# Patient Record
Sex: MALE | Race: WHITE
[De-identification: names, ages, dates, MRNs, and addresses within clinical notes are randomized per-mention and may not be internally consistent; named-entity substitution may affect disease eponyms.]

---

## 2017-12-06 ENCOUNTER — HOSPITAL ENCOUNTER (EMERGENCY)
Dept: HOSPITAL 76 - ED | Age: 52
Discharge: HOME | End: 2017-12-06
Payer: MEDICAID

## 2017-12-06 VITALS — DIASTOLIC BLOOD PRESSURE: 66 MMHG | SYSTOLIC BLOOD PRESSURE: 106 MMHG

## 2017-12-06 DIAGNOSIS — S68.129A: Primary | ICD-10-CM

## 2017-12-06 DIAGNOSIS — Z23: ICD-10-CM
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
## 2017-12-06 PROCEDURE — 99283 EMERGENCY DEPT VISIT LOW MDM: CPT

## 2017-12-06 PROCEDURE — 64450 NJX AA&/STRD OTHER PN/BRANCH: CPT

## 2017-12-06 PROCEDURE — 90471 IMMUNIZATION ADMIN: CPT

## 2017-12-06 NOTE — ED PHYSICIAN DOCUMENTATION
History of Present Illness





- Stated complaint


Stated Complaint: LT THUMB LAC





- Chief complaint


Chief Complaint: Ext Problem





- Additonal information


Additional information: 





hx from pt


L thumb tip amputation with clean knife





Review of Systems


Skin: reports: Laceration (s)


Endocrine: denies: Easy bruising / bleeding


Immunocompromised: denies: Immunocompromised





PD PAST MEDICAL HISTORY





- Past Medical History


Past Medical History: Yes


Musculoskeletal: Gout





- Past Surgical History


Derm: Skin cancer surgery





- Present Medications


Home Medications: 


 Ambulatory Orders











 Medication  Instructions  Recorded  Confirmed


 


Diazepam [Valium] 0 mg PO PRN PRN 12/06/17 12/06/17


 


Diclofenac/Benzalkonium Chlor [Ds  12/06/17 





Prep Hong]   














- Allergies


Allergies/Adverse Reactions: 


 Allergies











Allergy/AdvReac Type Severity Reaction Status Date / Time


 


No Known Drug Allergies Allergy   Verified 12/06/17 18:31














- Social History


Does the pt smoke?: No


Smoking Status: Never smoker





PD ED PE NORMAL





- Vitals


Vital signs reviewed: Yes





- Derm


Derm: Other (1.5 cm aprox tip amputtaion tip L thumb no bone, MSV intact)





Results





- Vitals


Vitals: 





 Vital Signs - 24 hr











  12/06/17





  18:27


 


Temperature 36.7 C


 


Heart Rate 103 H


 


Respiratory 16





Rate 


 


Blood Pressure 117/73


 


O2 Saturation 99








 Oxygen











O2 Source                      Room air

















Procedures





- Regional nerve block


Nerve block site: Digital - note digit(s) (L thumb)


Right / left: Left


Nerve block anesthesia: Marcaine 0.5% (4 cc)


Nerve block aftercare: Patient tolerated well





Departure





- Departure


Disposition: 01 Home, Self Care


Clinical Impression: 


Amputation of finger tip


Qualifiers:


 Encounter type: initial encounter Qualified Code(s): S68.129A - Partial 

traumatic metacarpophalangeal amputation of unspecified finger, initial 

encounter





Condition: Good


Instructions:  ED Laceration Amputation Finger Tip Open Tx


Follow-Up: 


MODESTA GARDNER [Primary Care Provider] - 


Comments: 


Leave the dressing on for 2 days.


Then may remove the dressing and gently wash the finger and apply antibiotic 

ointment and a bandaid every day until healed


Return if worse

## 2023-05-09 ENCOUNTER — HOSPITAL ENCOUNTER (EMERGENCY)
Dept: HOSPITAL 76 - ED | Age: 58
LOS: 1 days | Discharge: HOME | End: 2023-05-10
Payer: COMMERCIAL

## 2023-05-09 ENCOUNTER — HOSPITAL ENCOUNTER (OUTPATIENT)
Dept: HOSPITAL 76 - EMS | Age: 58
Discharge: ANOTHER HOSPITAL | End: 2023-05-09
Payer: COMMERCIAL

## 2023-05-09 VITALS — SYSTOLIC BLOOD PRESSURE: 112 MMHG | DIASTOLIC BLOOD PRESSURE: 86 MMHG

## 2023-05-09 DIAGNOSIS — E87.6: Primary | ICD-10-CM
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
## 2023-05-09 DIAGNOSIS — F10.90: ICD-10-CM

## 2023-05-09 DIAGNOSIS — S00.81XA: ICD-10-CM
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
## 2023-05-09 DIAGNOSIS — S00.83XA: ICD-10-CM

## 2023-05-09 DIAGNOSIS — R47.81: ICD-10-CM
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
## 2023-05-09 DIAGNOSIS — M54.6: Primary | ICD-10-CM

## 2023-05-09 LAB
ALBUMIN DIAFP-MCNC: 4 G/DL (ref 3.2–5.5)
ALBUMIN/GLOB SERPL: 1.1 {RATIO} (ref 1–2.2)
ALP SERPL-CCNC: 71 IU/L (ref 42–121)
ALT SERPL W P-5'-P-CCNC: 24 IU/L (ref 10–60)
AMPHET UR QL SCN: NEGATIVE
ANION GAP SERPL CALCULATED.4IONS-SCNC: 12 MMOL/L (ref 6–13)
AST SERPL W P-5'-P-CCNC: 21 IU/L (ref 10–42)
BARBITURATES UR QL SCN>300 NG/ML: NEGATIVE
BASOPHILS NFR BLD AUTO: 0.1 10^3/UL (ref 0–0.1)
BASOPHILS NFR BLD AUTO: 0.9 %
BENZODIAZ UR QL SCN: POSITIVE
BILIRUB BLD-MCNC: 0.4 MG/DL (ref 0.2–1)
BUN SERPL-MCNC: 16 MG/DL (ref 6–20)
CALCIUM UR-MCNC: 9.2 MG/DL (ref 8.5–10.3)
CHLORIDE SERPL-SCNC: 106 MMOL/L (ref 101–111)
CO2 SERPL-SCNC: 21 MMOL/L (ref 21–32)
COCAINE UR-SCNC: NEGATIVE UMOL/L
CREAT SERPLBLD-SCNC: 1.1 MG/DL (ref 0.6–1.2)
EOSINOPHIL # BLD AUTO: 0.3 10^3/UL (ref 0–0.7)
EOSINOPHIL NFR BLD AUTO: 4 %
ERYTHROCYTE [DISTWIDTH] IN BLOOD BY AUTOMATED COUNT: 13.2 % (ref 12–15)
ETHANOL BLD-MCNC: 257 MG/DL
GFRSERPLBLD MDRD-ARVRAT: 69 ML/MIN/{1.73_M2} (ref 89–?)
GLOBULIN SER-MCNC: 3.7 G/DL (ref 2.1–4.2)
GLUCOSE SERPL-MCNC: 145 MG/DL (ref 70–100)
HCT VFR BLD AUTO: 40.9 % (ref 42–52)
HGB UR QL STRIP: 13.7 G/DL (ref 14–18)
LIPASE SERPL-CCNC: 36 U/L (ref 22–51)
LYMPHOCYTES # SPEC AUTO: 4.8 10^3/UL (ref 1.5–3.5)
LYMPHOCYTES NFR BLD AUTO: 57.1 %
MCH RBC QN AUTO: 31.9 PG (ref 27–31)
MCHC RBC AUTO-ENTMCNC: 33.5 G/DL (ref 32–36)
MCV RBC AUTO: 95.3 FL (ref 80–94)
METHADONE UR QL SCN: NEGATIVE
METHAMPHET UR QL SCN: NEGATIVE
MONOCYTES # BLD AUTO: 0.7 10^3/UL (ref 0–1)
MONOCYTES NFR BLD AUTO: 8.6 %
NEUTROPHILS # BLD AUTO: 2.5 10^3/UL (ref 1.5–6.6)
NEUTROPHILS # SNV AUTO: 8.5 X10^3/UL (ref 4.8–10.8)
NEUTROPHILS NFR BLD AUTO: 29.2 %
NRBC # BLD AUTO: 0 /100WBC
NRBC # BLD AUTO: 0 X10^3/UL
OPIATES UR QL SCN: NEGATIVE
PDW BLD AUTO: 9 FL (ref 7.4–11.4)
PLATELET # BLD: 288 10^3/UL (ref 130–450)
POTASSIUM SERPL-SCNC: 3.3 MMOL/L (ref 3.5–5)
PROT SPEC-MCNC: 7.7 G/DL (ref 6.7–8.2)
RBC MAR: 4.29 10^6/UL (ref 4.7–6.1)
SODIUM SERPLBLD-SCNC: 139 MMOL/L (ref 135–145)
THC UR QL SCN: POSITIVE
VOLATILE DRUGS POS SERPL SCN: (no result)

## 2023-05-09 PROCEDURE — 36415 COLL VENOUS BLD VENIPUNCTURE: CPT

## 2023-05-09 PROCEDURE — 85025 COMPLETE CBC W/AUTO DIFF WBC: CPT

## 2023-05-09 PROCEDURE — 83690 ASSAY OF LIPASE: CPT

## 2023-05-09 PROCEDURE — 99283 EMERGENCY DEPT VISIT LOW MDM: CPT

## 2023-05-09 PROCEDURE — 80320 DRUG SCREEN QUANTALCOHOLS: CPT

## 2023-05-09 PROCEDURE — 96374 THER/PROPH/DIAG INJ IV PUSH: CPT

## 2023-05-09 PROCEDURE — 80306 DRUG TEST PRSMV INSTRMNT: CPT

## 2023-05-09 PROCEDURE — 80053 COMPREHEN METABOLIC PANEL: CPT
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
